# Patient Record
Sex: MALE | Race: WHITE | NOT HISPANIC OR LATINO | URBAN - METROPOLITAN AREA
[De-identification: names, ages, dates, MRNs, and addresses within clinical notes are randomized per-mention and may not be internally consistent; named-entity substitution may affect disease eponyms.]

---

## 2020-09-18 ENCOUNTER — EMERGENCY (EMERGENCY)
Facility: HOSPITAL | Age: 65
LOS: 1 days | Discharge: HOME | End: 2020-09-18
Attending: EMERGENCY MEDICINE
Payer: MEDICAID

## 2020-09-18 VITALS
DIASTOLIC BLOOD PRESSURE: 61 MMHG | WEIGHT: 158.73 LBS | TEMPERATURE: 98 F | OXYGEN SATURATION: 96 % | HEIGHT: 66 IN | HEART RATE: 60 BPM | SYSTOLIC BLOOD PRESSURE: 150 MMHG | RESPIRATION RATE: 16 BRPM

## 2020-09-18 PROCEDURE — 99283 EMERGENCY DEPT VISIT LOW MDM: CPT

## 2020-09-18 RX ORDER — CIPROFLOXACIN HCL 0.3 %
2 DROPS OPHTHALMIC (EYE)
Qty: 1 | Refills: 0
Start: 2020-09-18 | End: 2020-09-24

## 2020-09-18 RX ORDER — ACETAMINOPHEN 500 MG
2 TABLET ORAL
Qty: 24 | Refills: 0
Start: 2020-09-18 | End: 2020-09-20

## 2020-09-18 NOTE — ED PROVIDER NOTE - CLINICAL SUMMARY MEDICAL DECISION MAKING FREE TEXT BOX
Patient presenting with foreign body in left eye with left eye pain. Foreign body removed. Will discharge with antibiotics and refer to opthalmology.

## 2020-09-18 NOTE — ED PROVIDER NOTE - PATIENT PORTAL LINK FT
You can access the FollowMyHealth Patient Portal offered by [facility] by registering at the following website: [email or web address]. By joining [employer]’s FollowMyHealth portal, you will also be able to view your health information using other applications (apps) compatible with our system.

## 2020-09-18 NOTE — ED ADULT NURSE NOTE - OBJECTIVE STATEMENT
States he has left eye pain x3 days. Wears eye glasses States he has left eye pain x2 days. States he was walking next to a construction site when he felt something got in his left eye .Wears eye glasses

## 2020-09-18 NOTE — ED PROVIDER NOTE - OBJECTIVE STATEMENT
64 year old male presenting to the ed with complaints of left eye pain since 2 days ago. He reports that he was walking by a construction site when he felt something come into his eye. Since then has been having left eye pain and tearing.

## 2021-01-23 ENCOUNTER — EMERGENCY (EMERGENCY)
Facility: HOSPITAL | Age: 66
LOS: 1 days | Discharge: HOME | End: 2021-01-23
Attending: EMERGENCY MEDICINE
Payer: MEDICAID

## 2021-01-23 VITALS
HEART RATE: 56 BPM | HEIGHT: 66 IN | RESPIRATION RATE: 17 BRPM | DIASTOLIC BLOOD PRESSURE: 74 MMHG | WEIGHT: 166.45 LBS | SYSTOLIC BLOOD PRESSURE: 132 MMHG | TEMPERATURE: 99 F | OXYGEN SATURATION: 98 %

## 2021-01-23 PROCEDURE — 99282 EMERGENCY DEPT VISIT SF MDM: CPT

## 2021-01-23 PROCEDURE — 82962 GLUCOSE BLOOD TEST: CPT

## 2021-01-23 NOTE — ED ADULT NURSE NOTE - CHIEF COMPLAINT QUOTE
as per Samoan  tiffanie [id number] patient received vitamin b12 a week ago and was rx by pcp for another shot and he lives nearby so he came to the ER to get the vitamin b12 shot. patient arrived with a box of trulicity pens

## 2021-01-23 NOTE — ED PROVIDER NOTE - OBJECTIVE STATEMENT
Translation services for South Korean were utilized. 66 y/o M patient with history of DM presents for B12 injection. Patient states he told by PMD to  vitamin b from pharmacy and go to the ED for administration. However, patient presents with Trulicity and told patient that Trulicity and vitamin 12 are two different things. Provider offered to reach out to PMD but patient states doctor is not available to talk to and doesn't want ED to reach out to PMD. Patient denies any physical complaints at this time. Translation services for Barbadian were utilized. 64 y/o M patient with history of DM presents for B12 injection. Patient states he told by PMD to  vitamin b from pharmacy and go to the ED for administration. However, patient presents with Trulicity.  Provider explained to patient patient that Trulicity and vitamin 12 are two different things. Provider offered to reach out to PMD but patient states doctor is not available to talk to and doesn't want ED to reach out to PMD. Patient denies any physical complaints at this time.  States he would prefer to go to his MD on monday to clarify which injection is to be given and no longer wants injection in ED.

## 2021-01-23 NOTE — ED ADULT NURSE NOTE - CAS ELECT INFOMATION PROVIDED
pt seen,treated and discharge by JOSSELIN TALAMANTES, no nursing intervention needed/DC instructions

## 2021-01-23 NOTE — ED PROVIDER NOTE - PHYSICAL EXAMINATION
GEN:   comfortable, in no apparent distress, AOx3  RESP:   clear to auscultation bilaterally, non-labored, speaking in full sentences  ABD:   soft, non tender, no guarding  :   no cva tenderness  MSK:   no musculoskeletal tenderness, 5/5 strength, moving all extremities  SKIN:   dry, intact, no rash  NEURO:   AOx3, no focal weakness or loss of sensation, gait normal, GCS 15  PSYCH: calm, cooperative, no apparent risk to self and others

## 2021-01-23 NOTE — ED PROVIDER NOTE - NSFOLLOWUPINSTRUCTIONS_ED_ALL_ED_FT
Follow up with your primary care physician in 48-72 hours.    Return to the Emergency Department for worsening or persistent symptoms OR ANY NEW OR CONCERNING SYMPTOMS.

## 2021-01-23 NOTE — ED ADULT TRIAGE NOTE - CHIEF COMPLAINT QUOTE
as per Vietnamese  tiffanie [id number] patient received vitamin b12 a week ago and was rx by pcp for another shot and he lives nearby so he came to the ER to get the vitamin b12 shot. patient arrived with a box of trulicity pens

## 2021-01-23 NOTE — ED PROVIDER NOTE - PATIENT PORTAL LINK FT
You can access the FollowMyHealth Patient Portal offered by [facility] by registering at the following website: [email or web address]. By joining [employer]’s FollowMyHealth portal, you will also be able to view your health information using other applications (apps) compatible with our system.

## 2021-01-23 NOTE — ED PROVIDER NOTE - ATTENDING CONTRIBUTION TO CARE
I completed an independent physical examination.   I have signed out the follow up of any pending tests (i.e. labs, radiological studies) to the PA/NP.  I have discussed the patient’s plan of care and disposition with the PA/NP    Patient presenting to ED for injection of prescribed medication. Patient present with incorrect medication. Requesting dc to follow up with PCP. Will dc with pcp to clear up prescription confusion.

## 2021-07-11 ENCOUNTER — EMERGENCY (EMERGENCY)
Facility: HOSPITAL | Age: 66
LOS: 1 days | Discharge: HOME | End: 2021-07-11
Attending: EMERGENCY MEDICINE
Payer: MEDICAID

## 2021-07-11 VITALS
DIASTOLIC BLOOD PRESSURE: 69 MMHG | SYSTOLIC BLOOD PRESSURE: 122 MMHG | TEMPERATURE: 98 F | OXYGEN SATURATION: 98 % | RESPIRATION RATE: 18 BRPM | HEART RATE: 68 BPM

## 2021-07-11 PROCEDURE — L9993: CPT

## 2021-07-11 PROCEDURE — 99281 EMR DPT VST MAYX REQ PHY/QHP: CPT

## 2021-07-11 NOTE — ED ADULT NURSE NOTE - NS ED NURSE ERROR FT
PT REPORTS NEEDS COVID TEST FOR TRAVEL. DENIES S/S OF COVID. DR. SAUER NOTIFIED. PT REFUSED COVID TESTING AT THIS TIME

## 2023-01-17 ENCOUNTER — EMERGENCY (EMERGENCY)
Facility: HOSPITAL | Age: 68
LOS: 1 days | Discharge: HOME | End: 2023-01-17
Attending: EMERGENCY MEDICINE
Payer: MEDICAID

## 2023-01-17 VITALS
DIASTOLIC BLOOD PRESSURE: 70 MMHG | TEMPERATURE: 98 F | RESPIRATION RATE: 18 BRPM | SYSTOLIC BLOOD PRESSURE: 142 MMHG | OXYGEN SATURATION: 97 % | WEIGHT: 164.91 LBS | HEIGHT: 64.96 IN | HEART RATE: 54 BPM

## 2023-01-17 VITALS
DIASTOLIC BLOOD PRESSURE: 75 MMHG | HEART RATE: 53 BPM | OXYGEN SATURATION: 96 % | SYSTOLIC BLOOD PRESSURE: 138 MMHG | RESPIRATION RATE: 18 BRPM | TEMPERATURE: 98 F

## 2023-01-17 PROCEDURE — 99291 CRITICAL CARE FIRST HOUR: CPT | Mod: 25

## 2023-01-17 PROCEDURE — 12011 RPR F/E/E/N/L/M 2.5 CM/<: CPT

## 2023-01-17 RX ORDER — TRANEXAMIC ACID 100 MG/ML
5 INJECTION, SOLUTION INTRAVENOUS ONCE
Refills: 0 | Status: DISCONTINUED | OUTPATIENT
Start: 2023-01-17 | End: 2023-01-20

## 2023-01-17 RX ORDER — TRANEXAMIC ACID 100 MG/ML
5 INJECTION, SOLUTION INTRAVENOUS ONCE
Refills: 0 | Status: COMPLETED | OUTPATIENT
Start: 2023-01-17 | End: 2023-01-17

## 2023-01-17 RX ADMIN — TRANEXAMIC ACID 5 MILLILITER(S): 100 INJECTION, SOLUTION INTRAVENOUS at 03:16

## 2023-01-17 RX ADMIN — Medication 1 TABLET(S): at 08:34

## 2023-01-17 NOTE — ED PROVIDER NOTE - PHYSICAL EXAMINATION
Afebrile, hemodynamically stable, saturating well on room air  NAD, well appearing, sitting comfortably in bed, no WOB, speaking full sentences  Head NCAT  EOMI grossly, anicteric, no conjunctival pallor  MMM, noted bleeding trickling from left lower teeth  RRR  Breathing comfortably on room air  AAO, CN's 3-12 grossly intact  BEGUM spontaneously, no leg cyanosis or edema  Skin warm, dry, no rashes or hives

## 2023-01-17 NOTE — ED PROVIDER NOTE - OBJECTIVE STATEMENT
Bermudian  [id number].  67-year-old male with history of CAD status post stents on aspirin/Plavix, diabetes, presents with bleeding from the site of a dental extraction where a bridge had broken, was performed at 5 PM today and states has been bleeding since then. Denies pain, fever, dizziness, and all other symptoms.

## 2023-01-17 NOTE — ED PROVIDER NOTE - CLINICAL SUMMARY MEDICAL DECISION MAKING FREE TEXT BOX
Character and extent of bleeding of low suspicion for acute blood loss anemia. Bleeding controlled with pressure alone but resumes after this. Area suctioned out and TXA soaked gauze and Surgicel placed over top Character and extent of bleeding of low suspicion for acute blood loss anemia. Bleeding controlled with pressure alone but resumes after this. Area suctioned out and TXA soaked gauze and Surgicel placed over top however continued bleeding after removed. Lido with epi infused and 2 absorbable sutures placed to approximate gums, with hemostasis achieved however still small amount of bleeding. Additional surgicell and TXA placed with better hemostasis. Pt states will certainly be able to see his dentist this morning. Patient is well appearing, NAD, afebrile, hemodynamically stable. D/w Tajik  [id number]. Given Augmentin. Discharged with instructions in further symptomatic care, return precautions, and need for dentist f/u.

## 2023-01-17 NOTE — ED PROVIDER NOTE - NSFOLLOWUPINSTRUCTIONS_ED_ALL_ED_FT
Please see your dentist this morning.  Please take the antibiotic as prescribed.  Please return to the emergency department if you have worsening bleeding, fever, or any other symptoms.    Uncontrolled Wound Bleeding    Uncontrolled bleeding can result from many causes. It can happen any time, especially for people who have an increased risk of bleeding. Uncontrolled bleeding can cause you to become confused or unconscious. It can also lead to shock or even be life-threatening. Call emergency services (911 in the [place] at the first sign of uncontrolled bleeding. Signs of uncontrolled bleeding include:  •Bleeding that does not stop even after applying direct pressure or using other techniques to stop it.  •Bleeding that spurts from a wound.   •Bleeding that pools in and around a wound and causes increased swelling.  •Bleeding that soaks through a dressing or clothing despite measures to stop it.    If you are at risk for uncontrolled bleeding, you should take precautions to protect yourself from cuts and other injuries that can cause bleeding. You must also know how to stop bleeding if it occurs.    Do I have an increased risk for uncontrolled bleeding?    You may be at greater risk for uncontrolled bleeding if:  •You have had a recent bleeding injury treated at an urgent care center or emergency room, especially if the injury is in an area of the body with a lot of blood vessels or large blood vessels.  •You have a bleeding disorder, such as hemophilia or von Willebrand disease.  •You are on a blood-thinning medicine (anticoagulant), such as warfarin.  •You take medicines such as aspirin and ibuprofen. These medicines can also thin your blood.  •You are on chemotherapy. Many chemotherapy medicines can decrease your body's normal blood-clotting ability.  •You have liver or kidney disease.    How to prevent bleeding    Take these precautions to help prevent bleeding:  •Be very careful when using knives, scissors, or other sharp objects.   •Use an electric razor instead of a blade.   •Do not use toothpicks.  •Use a soft-bristled toothbrush. Brush your teeth gently.   •Always wear shoes outdoors and wear slippers indoors.   •Be careful when cutting your fingernails and toenails.   •Place bath mats in the bathroom. If possible, install handrails as well.   •Wear gloves while you do yard work.   •Wear your seat belt.   •Prevent falls by removing loose rugs and extension cords from areas where you walk. Use a cane or walker if you need it.   •Avoid constipation by:   •Drinking enough fluid to keep your urine pale yellow.  •Eating foods that are high in fiber, such as beans, whole grains, and fresh fruits and vegetables.  •Limiting foods that are high in fat and processed sugars, such as fried or sweet foods.  •Do not play contact sports or participate in other activities that have a high risk for injury.    How to stop bleeding  A person rests an injured leg on another person's shoulder while that person holds a bandage to the leg.   If you are at risk for uncontrolled bleeding, ask your health care provider to help you assemble a first aid kit to control bleeding. Learn to use the supplies in your kit, and keep it handy at all times.    If someone is available to help when you have uncontrolled bleeding, ask the person to assist you and stay with you until emergency services arrive.    Follow these steps to stop bleedin.Find a safe place where you can remain still and calm. Lie down if possible.  2.Find the source of the bleeding.  3.Remove clothing over the wound to expose the area.  4.If possible, position yourself so that the body part with the wound is raised (elevated) above the level of your heart.  5.If you have a first aid kit, place a gauze pad from the kit over the bleeding area. If you do not have a kit, use any clean towel or cloth.  6.Put hard pressure over the wound. The smaller the wound, the smaller the area of pressure should be. Using the smallest surface possible generates more force directly onto the wound and is more efficient at controlling the bleeding. Maintain pressure until help arrives.  •If the wound is small, use your fingers to maintain direct pressure only on the wound as opposed to using your entire palm.  •If the wound is larger, use a larger surface area to apply pressure. This may involve using one or both of your palms.  •If the wound is large and gaping, wipe away any pooled blood and pack the wound with packing gauze from your bleeding kit or with a clean towel or cloth. Put pressure over the packed wound with both hands until emergency services arrive.  •For severe uncontrolled bleeding from an arm or leg, apply a tourniquet from your bleeding kit about 2–3 inches above the wound. Tighten the tourniquet until bleeding stops. Secure the tourniquet, and write down the time you placed it. You may still place pressure over the wound.    Follow these instructions at home:  •Take over-the-counter and prescription medicines only as told by your health care provider.  •If you are taking blood thinners:  •Talk with your health care provider before you take any medicines that contain aspirin or NSAIDs. These medicines increase your risk for dangerous bleeding.   •Take your medicine exactly as told, at the same time every day.   •Wear a medical alert bracelet or carry a card that lists what medicines you take.  •Keep all follow-up visits as told by your health care provider. This is important.    Contact a health care provider if you:  •Have menstrual bleeding that is heavier than normal.  •Have bloody or brown urine.  •Have easy bruising.  •Have stool that is black, tarry, bright red, or maroon.  •Vomit material that is dark brown, black, or red.  •Feel weak or dizzy.    Get help right away if you:  •Have bleeding that does not stop despite applying first aid measures.  •Have sudden, severe bleeding.  •Have chest pain.  •Have shortness of breath.  •Faint.    Summary  •Call emergency services (911 in the [place] at the first sign of uncontrolled bleeding.  •Signs of uncontrolled bleeding include bleeding that will not stop or bleeding that spurts, pools, or soaks through a dressing.  •Follow the steps to help stop bleeding until help arrives.  •If someone is available to help when you have uncontrolled bleeding, ask the person to assist you and stay with you until emergency services arrive.    This information is not intended to replace advice given to you by your health care provider. Make sure you discuss any questions you have with your health care provider.

## 2023-01-17 NOTE — ED PROCEDURE NOTE - PROCEDURE ADDITIONAL DETAILS
Bleeding controlled with pressure alone but resumes after this. Area suctioned out and TXA soaked gauze and Surgicel placed over top however continued bleeding after removed. Lido with epi infused and 2 absorbable sutures placed to approximate gums, with hemostasis achieved however still small amount of bleeding. Additional surgicell and TXA placed with better hemostasis.

## 2023-01-17 NOTE — ED ADULT NURSE REASSESSMENT NOTE - NS ED NURSE REASSESS COMMENT FT1
Bleeding persists, pressure dressing applied with med to stop the bleeding. suction PRN. Sutures applied. Bleeding reduced and medication applied to completely stop the bleeding. denies any c/o pain/discomfort.

## 2023-01-17 NOTE — ED ADULT TRIAGE NOTE - CHIEF COMPLAINT QUOTE
Patient presents to ED with c/o bleeding from gum s/p 2x left lower teeth pulled today around 1700. Patient endorsed bleeding from site.  Patient is currently taken plavix and aspirin.

## 2023-01-17 NOTE — ED PROVIDER NOTE - PATIENT PORTAL LINK FT
You can access the FollowMyHealth Patient Portal offered by [facility] by registering at the following website: [email or web address]. By joining [employer]’s FollowMyHealth portal, you will also be able to view your health information using other applications (apps) compatible with our system.

## 2023-11-30 NOTE — ED PROVIDER NOTE - DISPOSITION TYPE
-- DO NOT REPLY / DO NOT REPLY ALL --  -- Message is from [facility] [employer] ([facility]) --    General Patient Message: Patient had a UTI and is done with the medication and  Think that its back and will like to get another dose of the anabiotics sent to the pharmacy on file      Caller Information       Type Contact Phone/Fax    11/30/2023 10:15 AM CST Phone (Incoming) Lorraine Rudolph (Self) [phone] (H)        Alternative phone number: na    Can a detailed message be left? Yes    Message Turnaround:     Is it Working Hours? Yes - Working Hours     [place]:    Please give this turnaround time to the caller:   \"This message will be sent to [state Provider's name]. The clinical team will fulfill your request as soon as they review your message.\"                 DISCHARGE

## 2024-04-22 ENCOUNTER — EMERGENCY (EMERGENCY)
Facility: HOSPITAL | Age: 69
LOS: 1 days | Discharge: HOME | End: 2024-04-22
Attending: EMERGENCY MEDICINE
Payer: MEDICARE

## 2024-04-22 VITALS
RESPIRATION RATE: 20 BRPM | HEART RATE: 69 BPM | HEIGHT: 64.96 IN | TEMPERATURE: 98 F | OXYGEN SATURATION: 96 % | WEIGHT: 159.39 LBS | SYSTOLIC BLOOD PRESSURE: 129 MMHG | DIASTOLIC BLOOD PRESSURE: 73 MMHG

## 2024-04-22 LAB
ALBUMIN SERPL ELPH-MCNC: 3.9 G/DL — SIGNIFICANT CHANGE UP (ref 3.5–5)
ALP SERPL-CCNC: 182 U/L — HIGH (ref 40–120)
ALT FLD-CCNC: 77 U/L DA — HIGH (ref 10–60)
ANION GAP SERPL CALC-SCNC: 6 MMOL/L — SIGNIFICANT CHANGE UP (ref 5–17)
APPEARANCE UR: CLEAR — SIGNIFICANT CHANGE UP
AST SERPL-CCNC: 21 U/L — SIGNIFICANT CHANGE UP (ref 10–40)
BACTERIA # UR AUTO: ABNORMAL /HPF
BASOPHILS # BLD AUTO: 0.07 K/UL — SIGNIFICANT CHANGE UP (ref 0–0.2)
BASOPHILS NFR BLD AUTO: 0.7 % — SIGNIFICANT CHANGE UP (ref 0–2)
BILIRUB SERPL-MCNC: 0.5 MG/DL — SIGNIFICANT CHANGE UP (ref 0.2–1.2)
BILIRUB UR-MCNC: NEGATIVE — SIGNIFICANT CHANGE UP
BUN SERPL-MCNC: 22 MG/DL — HIGH (ref 7–18)
CALCIUM SERPL-MCNC: 9.5 MG/DL — SIGNIFICANT CHANGE UP (ref 8.4–10.5)
CHLORIDE SERPL-SCNC: 103 MMOL/L — SIGNIFICANT CHANGE UP (ref 96–108)
CO2 SERPL-SCNC: 25 MMOL/L — SIGNIFICANT CHANGE UP (ref 22–31)
COLOR SPEC: YELLOW — SIGNIFICANT CHANGE UP
CREAT SERPL-MCNC: 1.4 MG/DL — HIGH (ref 0.5–1.3)
D DIMER BLD IA.RAPID-MCNC: <150 NG/ML DDU — SIGNIFICANT CHANGE UP
DIFF PNL FLD: NEGATIVE — SIGNIFICANT CHANGE UP
EGFR: 55 ML/MIN/1.73M2 — LOW
EOSINOPHIL # BLD AUTO: 0.25 K/UL — SIGNIFICANT CHANGE UP (ref 0–0.5)
EOSINOPHIL NFR BLD AUTO: 2.5 % — SIGNIFICANT CHANGE UP (ref 0–6)
GLUCOSE SERPL-MCNC: 309 MG/DL — HIGH (ref 70–99)
GLUCOSE UR QL: >=1000 MG/DL
GRAN CASTS # UR COMP ASSIST: SIGNIFICANT CHANGE UP
HCT VFR BLD CALC: 46.6 % — SIGNIFICANT CHANGE UP (ref 39–50)
HGB BLD-MCNC: 15.9 G/DL — SIGNIFICANT CHANGE UP (ref 13–17)
IMM GRANULOCYTES NFR BLD AUTO: 0.2 % — SIGNIFICANT CHANGE UP (ref 0–0.9)
KETONES UR-MCNC: NEGATIVE MG/DL — SIGNIFICANT CHANGE UP
LEUKOCYTE ESTERASE UR-ACNC: NEGATIVE — SIGNIFICANT CHANGE UP
LYMPHOCYTES # BLD AUTO: 2.65 K/UL — SIGNIFICANT CHANGE UP (ref 1–3.3)
LYMPHOCYTES # BLD AUTO: 26.9 % — SIGNIFICANT CHANGE UP (ref 13–44)
MAGNESIUM SERPL-MCNC: 2.4 MG/DL — SIGNIFICANT CHANGE UP (ref 1.6–2.6)
MCHC RBC-ENTMCNC: 30 PG — SIGNIFICANT CHANGE UP (ref 27–34)
MCHC RBC-ENTMCNC: 34.1 GM/DL — SIGNIFICANT CHANGE UP (ref 32–36)
MCV RBC AUTO: 87.9 FL — SIGNIFICANT CHANGE UP (ref 80–100)
MONOCYTES # BLD AUTO: 0.46 K/UL — SIGNIFICANT CHANGE UP (ref 0–0.9)
MONOCYTES NFR BLD AUTO: 4.7 % — SIGNIFICANT CHANGE UP (ref 2–14)
NEUTROPHILS # BLD AUTO: 6.39 K/UL — SIGNIFICANT CHANGE UP (ref 1.8–7.4)
NEUTROPHILS NFR BLD AUTO: 65 % — SIGNIFICANT CHANGE UP (ref 43–77)
NITRITE UR-MCNC: NEGATIVE — SIGNIFICANT CHANGE UP
NRBC # BLD: 0 /100 WBCS — SIGNIFICANT CHANGE UP (ref 0–0)
NT-PROBNP SERPL-SCNC: 22 PG/ML — SIGNIFICANT CHANGE UP (ref 0–125)
PH UR: 5 — SIGNIFICANT CHANGE UP (ref 5–8)
PLATELET # BLD AUTO: 310 K/UL — SIGNIFICANT CHANGE UP (ref 150–400)
POTASSIUM SERPL-MCNC: 4.7 MMOL/L — SIGNIFICANT CHANGE UP (ref 3.5–5.3)
POTASSIUM SERPL-SCNC: 4.7 MMOL/L — SIGNIFICANT CHANGE UP (ref 3.5–5.3)
PROT SERPL-MCNC: 7.6 G/DL — SIGNIFICANT CHANGE UP (ref 6–8.3)
PROT UR-MCNC: NEGATIVE MG/DL — SIGNIFICANT CHANGE UP
RBC # BLD: 5.3 M/UL — SIGNIFICANT CHANGE UP (ref 4.2–5.8)
RBC # FLD: 12.1 % — SIGNIFICANT CHANGE UP (ref 10.3–14.5)
RBC CASTS # UR COMP ASSIST: 1 /HPF — SIGNIFICANT CHANGE UP (ref 0–4)
SODIUM SERPL-SCNC: 134 MMOL/L — LOW (ref 135–145)
SP GR SPEC: 1.03 — HIGH (ref 1–1.03)
TROPONIN I, HIGH SENSITIVITY RESULT: 7.8 NG/L — SIGNIFICANT CHANGE UP
UROBILINOGEN FLD QL: 0.2 MG/DL — SIGNIFICANT CHANGE UP (ref 0.2–1)
WBC # BLD: 9.84 K/UL — SIGNIFICANT CHANGE UP (ref 3.8–10.5)
WBC # FLD AUTO: 9.84 K/UL — SIGNIFICANT CHANGE UP (ref 3.8–10.5)
WBC UR QL: 1 /HPF — SIGNIFICANT CHANGE UP (ref 0–5)

## 2024-04-22 PROCEDURE — 99285 EMERGENCY DEPT VISIT HI MDM: CPT

## 2024-04-22 PROCEDURE — 83880 ASSAY OF NATRIURETIC PEPTIDE: CPT

## 2024-04-22 PROCEDURE — 80053 COMPREHEN METABOLIC PANEL: CPT

## 2024-04-22 PROCEDURE — 71045 X-RAY EXAM CHEST 1 VIEW: CPT | Mod: 26

## 2024-04-22 PROCEDURE — 84484 ASSAY OF TROPONIN QUANT: CPT

## 2024-04-22 PROCEDURE — 85025 COMPLETE CBC W/AUTO DIFF WBC: CPT

## 2024-04-22 PROCEDURE — 71045 X-RAY EXAM CHEST 1 VIEW: CPT

## 2024-04-22 PROCEDURE — 82962 GLUCOSE BLOOD TEST: CPT

## 2024-04-22 PROCEDURE — 87086 URINE CULTURE/COLONY COUNT: CPT

## 2024-04-22 PROCEDURE — 81001 URINALYSIS AUTO W/SCOPE: CPT

## 2024-04-22 PROCEDURE — 36415 COLL VENOUS BLD VENIPUNCTURE: CPT

## 2024-04-22 PROCEDURE — 83735 ASSAY OF MAGNESIUM: CPT

## 2024-04-22 PROCEDURE — 93005 ELECTROCARDIOGRAM TRACING: CPT

## 2024-04-22 PROCEDURE — 85379 FIBRIN DEGRADATION QUANT: CPT

## 2024-04-22 PROCEDURE — 99285 EMERGENCY DEPT VISIT HI MDM: CPT | Mod: 25

## 2024-04-22 RX ORDER — LIDOCAINE 4 G/100G
1 CREAM TOPICAL ONCE
Refills: 0 | Status: COMPLETED | OUTPATIENT
Start: 2024-04-22 | End: 2024-04-22

## 2024-04-22 RX ORDER — CYCLOBENZAPRINE HYDROCHLORIDE 10 MG/1
5 TABLET, FILM COATED ORAL ONCE
Refills: 0 | Status: COMPLETED | OUTPATIENT
Start: 2024-04-22 | End: 2024-04-22

## 2024-04-22 RX ORDER — OXYCODONE HYDROCHLORIDE 5 MG/1
5 TABLET ORAL ONCE
Refills: 0 | Status: DISCONTINUED | OUTPATIENT
Start: 2024-04-22 | End: 2024-04-22

## 2024-04-22 RX ORDER — ACETAMINOPHEN 500 MG
650 TABLET ORAL ONCE
Refills: 0 | Status: COMPLETED | OUTPATIENT
Start: 2024-04-22 | End: 2024-04-22

## 2024-04-22 RX ADMIN — CYCLOBENZAPRINE HYDROCHLORIDE 5 MILLIGRAM(S): 10 TABLET, FILM COATED ORAL at 20:55

## 2024-04-22 RX ADMIN — Medication 650 MILLIGRAM(S): at 20:55

## 2024-04-22 RX ADMIN — OXYCODONE HYDROCHLORIDE 5 MILLIGRAM(S): 5 TABLET ORAL at 23:50

## 2024-04-22 RX ADMIN — LIDOCAINE 1 PATCH: 4 CREAM TOPICAL at 20:55

## 2024-04-22 NOTE — ED PROVIDER NOTE - CLINICAL SUMMARY MEDICAL DECISION MAKING FREE TEXT BOX
68-year-old male PMH CAD s/p  stents on ASA and Plavix, diabetes presenting to emergency department for left lower back pain radiating to left lower extremity times days worsening today.  Patient states he has been having urinary frequency and was leaving the bathroom when he felt dizzy and had to lower himself to the ground, contrary to triage note patient did not fall, no LOC, no head strike.  Patient notes he has had intermittent episodes of dizziness over the past couple of months associated with shortness of breath.  Also notes left lateral calf pain over the past couple of months worsening today.  Family member at bedside is concerned about a DVT. Denies chest pain, shortness of breath, dizziness, abdominal pain, nausea, vomiting, diarrhea, fever, chills, rash, numbness, saddle anesthesia, bowel or bladder incontinence, difficulty ambulating, other complaint.  PE as above,  patient neuro intact do not suspect intracranial pathology, no red flag signs do not suspect acute bony vertebral or acute spinal cord pathology. No CVA ttp do not suspect pyelo. Suspect radiculopathy , r/o electrolyte abnormality, ACS and evaluate for UTI, analgesia, reassess, dispo accordingly.

## 2024-04-22 NOTE — ED PROVIDER NOTE - PROGRESS NOTE DETAILS
Discussed results through Russian ID [id number] Discussed results through Belizean ID [id number],  noted elevation in kidney function and discussed avoidance of NSAIDs and hydration. Aware to follow up with PMD for rpt labs, has appt tmrw.  Able to ambulate with his cane for which he uses at baseline. Red flag signs and symptoms discussed strict return precautions given patient verbalized understanding.

## 2024-04-22 NOTE — ED PROVIDER NOTE - PHYSICAL EXAMINATION
Gen: NAD, AOx3, able to make needs known, non-toxic  Head: NCAT  HEENT: EOMI, oral mucosa moist, normal conjunctiva  Lung: CTAB, no respiratory distress, no wheezes/rhonchi/rales B/L, speaking in full sentences  CV: RRR, no murmurs, no lower extremity swelling or calf tenderness, DP pulses intact  Abd: non distended, soft, nontender, no guarding, no CVA tenderness  MSK: no visible deformities, no midline spinal ttp, +left lumbar paraspinal ttp, FROM upper and lower extremities, 5/5 strength in all 4 extremities, sensation intact  Neuro: Awake and alert. Symmetric eyebrow raise, symmetric eyelid closure. PERRL b/l, EOMI b/l, symmetric smile, tongue midline. symmetric  strength bilaterally, full strength to elbow flexion and extension, full strength b/l shoulder shrug. patient able to straight leg raise against resistance with symmetric strength bilaterally. Sensation intact and symmetric grossly to light touch throughout face and bilateral upper and lower extremities   Skin: Warm, well perfused, no rash  Psych: normal affect

## 2024-04-22 NOTE — ED PROVIDER NOTE - PATIENT PORTAL LINK FT
You can access the FollowMyHealth Patient Portal offered by [facility] by registering at the following website: [email or web address]. By joining [employer]’s FollowMyHealth portal, you will also be able to view your health information using other applications (apps) compatible with our system.

## 2024-04-22 NOTE — ED PROVIDER NOTE - NSFOLLOWUPCLINICS_GEN_ALL_ED_FT
[facility]  Orthopedics  [address] [place] [address]  Phone: [phone]  Fax: [phone]

## 2024-04-22 NOTE — ED PROVIDER NOTE - CARE PLAN
1 Principal Discharge DX:	Back pain   Principal Discharge DX:	Back pain  Secondary Diagnosis:	Hyperglycemia

## 2024-04-22 NOTE — ED PROVIDER NOTE - OBJECTIVE STATEMENT
68-year-old male PMH CAD s/p  stents on ASA and Plavix, diabetes presenting to emergency department for left lower back pain radiating to left lower extremity times days worsening today.  Patient states he has been having urinary frequency and was leaving the bathroom when he felt dizzy and had to lower himself to the ground, contrary to triage note patient did not fall, no LOC, no head strike.  Patient notes he has had intermittent episodes of dizziness over the past couple of months associated with shortness of breath.  Also notes left lateral calf pain over the past couple of months worsening today.  Family member at bedside is concerned about a DVT. 68-year-old male PMH CAD s/p  stents on ASA and Plavix, diabetes presenting to emergency department for left lower back pain radiating to left lower extremity times days worsening today.  Patient states he has been having urinary frequency and was leaving the bathroom when he felt dizzy and had to lower himself to the ground, contrary to triage note patient did not fall, no LOC, no head strike.  Patient notes he has had intermittent episodes of dizziness over the past couple of months associated with shortness of breath.  Also notes left lateral calf pain over the past couple of months worsening today.  Family member at bedside is concerned about a DVT. Denies chest pain, shortness of breath, dizziness, abdominal pain, nausea, vomiting, diarrhea, fever, chills, rash, numbness, saddle anesthesia, bowel or bladder incontinence, difficulty ambulating, other complaint.

## 2024-04-22 NOTE — ED PROVIDER NOTE - NSFOLLOWUPINSTRUCTIONS_ED_ALL_ED_FT
1) Follow up with your doctor as discussed  2) Return to the ED immediately for new or worsening symptoms chest pain, shortness of breath, inability to walk, numbness  3) Please continue to take any home medications as prescribed  4) Your test results from your ED visit were discussed with you prior to discharge  5) You were provided with a copy of your test results  6) Avoid Motrin/Ibuprofen NSAIDs and stay well hydrated.     1) Prokonsulices'tiruytes' s vrachom, kak obsuzhdalos'.  2) Nemedlenno vernites' v otdeleniye neotlozhnoy pomoshchi v sluchaye poyavleniya novykh sylvia ukhudsheniya simptomov: boli v lisaudi, winniehki, nesposobnosti dallin', jonas.  3) Prodolzhayte prinimat' lyubyye domashniye lekarstva, kak predpisano.  4) Angelito osoriokh analizov vo vremya vashego vizita v otdeleniye neotlozhnoy pomoshchi byli obsuzhdeny s vami do vypiski.  5) Vam byla predostavlena ??margarita jimenez vashego natacha.  6) Izbegaeliseo NPVP «Motrin/Ibuprofen» i kai dostatorancho franco.

## 2024-04-22 NOTE — ED ADULT TRIAGE NOTE - CHIEF COMPLAINT QUOTE
c/o dizziness and Lt lower back pain, Left leg pain x2 days, pt states "I felt down today" denies any head injury

## 2024-04-22 NOTE — ED PROVIDER NOTE - ATTENDING APP SHARED VISIT CONTRIBUTION OF CARE
I was physically present for the E/M service provided. I agree with above history, physical, and plan which I have reviewed and edited where appropriate. I was physically present for the key portions of the service provided.    Cook: back pain, dizziness, wo focal findings. urinary frequency. pt denies falls.   aox4, cn ii-xii intact, b/l DP 2+, no dysmetria, neck supple. abd s/nt/nd.    ap: hyperglycemia r/o anemia vs uti vs dehydration vs back pain induced. no incontinence or infectious sx. pt neuro intact. labs, ua, cxr, re-assess

## 2024-04-23 LAB
CULTURE RESULTS: SIGNIFICANT CHANGE UP
SPECIMEN SOURCE: SIGNIFICANT CHANGE UP

## 2024-12-28 ENCOUNTER — EMERGENCY (EMERGENCY)
Facility: HOSPITAL | Age: 69
LOS: 1 days | Discharge: HOME | End: 2024-12-28
Attending: EMERGENCY MEDICINE
Payer: MEDICARE

## 2024-12-28 VITALS
RESPIRATION RATE: 18 BRPM | TEMPERATURE: 97 F | SYSTOLIC BLOOD PRESSURE: 152 MMHG | HEIGHT: 60 IN | DIASTOLIC BLOOD PRESSURE: 80 MMHG | HEART RATE: 66 BPM | WEIGHT: 154.98 LBS | OXYGEN SATURATION: 96 %

## 2024-12-28 PROCEDURE — 99284 EMERGENCY DEPT VISIT MOD MDM: CPT

## 2024-12-29 PROCEDURE — 96372 THER/PROPH/DIAG INJ SC/IM: CPT

## 2024-12-29 PROCEDURE — 99283 EMERGENCY DEPT VISIT LOW MDM: CPT | Mod: 25

## 2024-12-29 RX ORDER — KETOROLAC TROMETHAMINE 30 MG/ML
15 INJECTION INTRAMUSCULAR; INTRAVENOUS ONCE
Refills: 0 | Status: DISCONTINUED | OUTPATIENT
Start: 2024-12-29 | End: 2024-12-29

## 2024-12-29 RX ORDER — DIAZEPAM 10 MG/1
5 TABLET ORAL ONCE
Refills: 0 | Status: DISCONTINUED | OUTPATIENT
Start: 2024-12-29 | End: 2024-12-29

## 2024-12-29 RX ORDER — ACETAMINOPHEN 500MG 500 MG/1
650 TABLET, COATED ORAL ONCE
Refills: 0 | Status: COMPLETED | OUTPATIENT
Start: 2024-12-29 | End: 2024-12-29

## 2024-12-29 RX ORDER — CYCLOBENZAPRINE HCL 10 MG
1 TABLET ORAL
Qty: 21 | Refills: 0
Start: 2024-12-29 | End: 2025-01-04

## 2024-12-29 RX ORDER — IBUPROFEN 200 MG
1 TABLET ORAL
Qty: 28 | Refills: 0
Start: 2024-12-29 | End: 2025-01-04

## 2024-12-29 RX ORDER — DEXAMETHASONE 1.5 MG/1
8 TABLET ORAL ONCE
Refills: 0 | Status: COMPLETED | OUTPATIENT
Start: 2024-12-29 | End: 2024-12-29

## 2024-12-29 RX ADMIN — DEXAMETHASONE 8 MILLIGRAM(S): 1.5 TABLET ORAL at 02:21

## 2024-12-29 RX ADMIN — ACETAMINOPHEN 500MG 650 MILLIGRAM(S): 500 TABLET, COATED ORAL at 02:22

## 2024-12-29 RX ADMIN — DIAZEPAM 5 MILLIGRAM(S): 10 TABLET ORAL at 02:22

## 2024-12-29 RX ADMIN — KETOROLAC TROMETHAMINE 15 MILLIGRAM(S): 30 INJECTION INTRAMUSCULAR; INTRAVENOUS at 02:21

## 2024-12-29 NOTE — ED PROVIDER NOTE - CLINICAL SUMMARY MEDICAL DECISION MAKING FREE TEXT BOX
69-year-old male with right lower back pain rating to right lower extremity.  PE as above.    Patient declines to have any imaging done.  Will provide pain medications and reassess.

## 2024-12-29 NOTE — ED ADULT NURSE NOTE - NSFALLUNIVINTERV_ED_ALL_ED
Bed/Stretcher in lowest position, wheels locked, appropriate side rails in place/Call bell, personal items and telephone in reach/Instruct patient to call for assistance before getting out of bed/chair/stretcher/Non-slip footwear applied when patient is off stretcher/[place] to call system/Physically safe environment - no spills, clutter or unnecessary equipment/Purposeful proactive rounding/Room/bathroom lighting operational, light cord in reach

## 2024-12-29 NOTE — ED PROVIDER NOTE - OBJECTIVE STATEMENT
69-year-old male with a past medical history of CAD with stents, diabetes, history of sciatica presents with right lower back pain radiating to the right lower extremity  for the past day.  Patient states has history of this.  States he did take some baclofen at home but did not seem to help the pain so came to the ED for evaluation.  Patient states he recently did have a fall but states that there was no pain in his back or any issues with his back after that fall and does not want any imaging.

## 2024-12-29 NOTE — ED PROVIDER NOTE - PHYSICAL EXAMINATION
Tenderness to palpation of right lumbar paraspinal muscles.  Bilateral lower extremity strength is 5 out of 5 in all muscle groups and gross sensation is normal and equal in all dermatomes.

## 2024-12-29 NOTE — ED PROVIDER NOTE - PROGRESS NOTE DETAILS
Pain improved.  Patient is ambulatory to steady gait.  Will discharge.  Follow-up with primary care doctor.  Return precautions discussed.

## 2024-12-29 NOTE — ED PROVIDER NOTE - PATIENT PORTAL LINK FT
You can access the FollowMyHealth Patient Portal offered by [facility] by registering at the following website: [email or web address]. By joining [employer]’s FollowMyHealth portal, you will also be able to view your health information using other applications (apps) compatible with our system.
